# Patient Record
Sex: FEMALE | Race: WHITE | NOT HISPANIC OR LATINO | URBAN - METROPOLITAN AREA
[De-identification: names, ages, dates, MRNs, and addresses within clinical notes are randomized per-mention and may not be internally consistent; named-entity substitution may affect disease eponyms.]

---

## 2020-08-05 ENCOUNTER — OFFICE VISIT (OUTPATIENT)
Dept: URBAN - METROPOLITAN AREA CLINIC 98 | Facility: CLINIC | Age: 45
End: 2020-08-05
Payer: COMMERCIAL

## 2020-08-05 ENCOUNTER — DASHBOARD ENCOUNTERS (OUTPATIENT)
Age: 45
End: 2020-08-05

## 2020-08-05 DIAGNOSIS — R10.84 ABDOMINAL PAIN, ACUTE, GENERALIZED: ICD-10-CM

## 2020-08-05 PROCEDURE — 99203 OFFICE O/P NEW LOW 30 MIN: CPT | Performed by: INTERNAL MEDICINE

## 2020-08-05 RX ORDER — DICYCLOMINE HYDROCHLORIDE 20 MG/1
1 TABLET TABLET ORAL
OUTPATIENT
Start: 2020-08-05 | End: 2020-09-04

## 2020-08-05 NOTE — HPI-OTHER HISTORIES
Ms. Tomlin is a 43 yo F presenting with abdominal pain. She has been having stabbing, sharp  epigastric pain. The first episode began Sunday and she felt bloated and had fullness in the abdomen. Then she had pain that started and worsened and was intermittent like gas pain. It is much improved today. It happened like this for 48 hours one year ago. She has some nausea with it but no vomiting. She has loose stools after the pain begins for about 24 hours and it gets better. She takes Ibuprofen a few times per week for headaches. She has  had no sick contacts. She has had some associated fatigue. She did have more alcohol than normal the night before. She saw a PCP for this yesterday and I reviewed the CBC and CMP which were normal. RUQ ultrasound was also normal. Pain was 9/10 and is now 4/10.

## 2020-08-05 NOTE — EXAM-PHYSICAL EXAM
General Examination   Constitutional: well-developed, normal communication ability.   Eyes: Conjunctivae and eyelids appear normal, no scleral icterus.   Nose/nasopharynx, external nose: appear normal, normal appearance of lips.   Neck/thyroid: normal appearance, no masses felt.   Chest: No lesions or deformities.   Cardiovascular: No edema, no murmurs or gallops appreciated.   Gastrointestinal: No scars, normoactive bowel sounds, soft, + tenderness epigastric and lower abdomen b/l, no rebound tenderness, no shifting dullness, no organomegaly.   Lymphatic: Neck without lymphadenopathy.   Musculoskeletal: normal gait and station, no tenderness present.   Skin: no rashes or jaundice. No tenderness on palpation.    Neurologic: Oriented to person, place, time. Normal sensation, short term memory intact.    Psychiatric: Normal mood and appropriate affect.

## 2020-08-10 ENCOUNTER — OFFICE VISIT (OUTPATIENT)
Dept: URBAN - METROPOLITAN AREA CLINIC 128 | Facility: CLINIC | Age: 45
End: 2020-08-10

## 2020-08-21 ENCOUNTER — TELEPHONE ENCOUNTER (OUTPATIENT)
Dept: URBAN - METROPOLITAN AREA CLINIC 98 | Facility: CLINIC | Age: 45
End: 2020-08-21

## 2020-08-23 ENCOUNTER — TELEPHONE ENCOUNTER (OUTPATIENT)
Dept: URBAN - METROPOLITAN AREA CLINIC 98 | Facility: CLINIC | Age: 45
End: 2020-08-23

## 2020-08-23 RX ORDER — DICYCLOMINE HYDROCHLORIDE 20 MG/1
1 TABLET TABLET ORAL
Qty: 40 | Refills: 0